# Patient Record
Sex: MALE | Race: BLACK OR AFRICAN AMERICAN
[De-identification: names, ages, dates, MRNs, and addresses within clinical notes are randomized per-mention and may not be internally consistent; named-entity substitution may affect disease eponyms.]

---

## 2018-01-30 ENCOUNTER — HOSPITAL ENCOUNTER (INPATIENT)
Dept: HOSPITAL 92 - ERS | Age: 23
LOS: 1 days | Discharge: HOME | DRG: 133 | End: 2018-01-31
Attending: HOSPITALIST | Admitting: HOSPITALIST
Payer: COMMERCIAL

## 2018-01-30 VITALS — BODY MASS INDEX: 19.5 KG/M2

## 2018-01-30 DIAGNOSIS — D75.89: ICD-10-CM

## 2018-01-30 DIAGNOSIS — J45.909: ICD-10-CM

## 2018-01-30 DIAGNOSIS — D14.1: Primary | ICD-10-CM

## 2018-01-30 DIAGNOSIS — J98.4: ICD-10-CM

## 2018-01-30 DIAGNOSIS — J18.1: ICD-10-CM

## 2018-01-30 DIAGNOSIS — J47.0: ICD-10-CM

## 2018-01-30 LAB
ALBUMIN SERPL BCG-MCNC: 4.4 G/DL (ref 3.5–5)
ALP SERPL-CCNC: 81 U/L (ref 40–150)
ALT SERPL W P-5'-P-CCNC: 21 U/L (ref 8–55)
ANION GAP SERPL CALC-SCNC: 12 MMOL/L (ref 10–20)
AST SERPL-CCNC: 27 U/L (ref 5–34)
BASOPHILS # BLD AUTO: 0 THOU/UL (ref 0–0.2)
BASOPHILS NFR BLD AUTO: 0.2 % (ref 0–1)
BILIRUB SERPL-MCNC: 0.5 MG/DL (ref 0.2–1.2)
BUN SERPL-MCNC: 12 MG/DL (ref 8.9–20.6)
CALCIUM SERPL-MCNC: 10.1 MG/DL (ref 7.8–10.44)
CHLORIDE SERPL-SCNC: 97 MMOL/L (ref 98–107)
CO2 SERPL-SCNC: 36 MMOL/L (ref 22–29)
CREAT CL PREDICTED SERPL C-G-VRATE: 111 ML/MIN (ref 70–130)
EOSINOPHIL # BLD AUTO: 0 THOU/UL (ref 0–0.7)
EOSINOPHIL NFR BLD AUTO: 0.2 % (ref 0–10)
GLOBULIN SER CALC-MCNC: 3.9 G/DL (ref 2.4–3.5)
GLUCOSE SERPL-MCNC: 113 MG/DL (ref 70–105)
HGB BLD-MCNC: 15.8 G/DL (ref 14–18)
LYMPHOCYTES # BLD: 1.5 THOU/UL (ref 1.2–3.4)
LYMPHOCYTES NFR BLD AUTO: 20.5 % (ref 21–51)
MACROCYTES BLD QL SMEAR: (no result) (100X)
MCH RBC QN AUTO: 35.7 PG (ref 27–31)
MCV RBC AUTO: 108 FL (ref 80–94)
MDIFF COMPLETE?: YES
MONOCYTES # BLD AUTO: 0.1 THOU/UL (ref 0.11–0.59)
MONOCYTES NFR BLD AUTO: 1.4 % (ref 0–10)
NEUTROPHILS # BLD AUTO: 5.8 THOU/UL (ref 1.4–6.5)
NEUTROPHILS NFR BLD AUTO: 77.7 % (ref 42–75)
PLATELET # BLD AUTO: 214 THOU/UL (ref 130–400)
PLATELET BLD QL SMEAR: (no result)
POTASSIUM SERPL-SCNC: 5.3 MMOL/L (ref 3.5–5.1)
RBC # BLD AUTO: 4.43 MILL/UL (ref 4.7–6.1)
SODIUM SERPL-SCNC: 140 MMOL/L (ref 136–145)
WBC # BLD AUTO: 7.5 THOU/UL (ref 4.8–10.8)

## 2018-01-30 PROCEDURE — 81001 URINALYSIS AUTO W/SCOPE: CPT

## 2018-01-30 PROCEDURE — 80053 COMPREHEN METABOLIC PANEL: CPT

## 2018-01-30 PROCEDURE — 80048 BASIC METABOLIC PNL TOTAL CA: CPT

## 2018-01-30 PROCEDURE — S0028 INJECTION, FAMOTIDINE, 20 MG: HCPCS

## 2018-01-30 PROCEDURE — 85025 COMPLETE CBC W/AUTO DIFF WBC: CPT

## 2018-01-30 PROCEDURE — 0CBS8ZX EXCISION OF LARYNX, VIA NATURAL OR ARTIFICIAL OPENING ENDOSCOPIC, DIAGNOSTIC: ICD-10-PCS | Performed by: OTOLARYNGOLOGY

## 2018-01-30 PROCEDURE — 71260 CT THORAX DX C+: CPT

## 2018-01-30 PROCEDURE — 36415 COLL VENOUS BLD VENIPUNCTURE: CPT

## 2018-01-30 PROCEDURE — 0BJ08ZZ INSPECTION OF TRACHEOBRONCHIAL TREE, VIA NATURAL OR ARTIFICIAL OPENING ENDOSCOPIC: ICD-10-PCS | Performed by: OTOLARYNGOLOGY

## 2018-01-30 PROCEDURE — 0C5S8ZZ DESTRUCTION OF LARYNX, VIA NATURAL OR ARTIFICIAL OPENING ENDOSCOPIC: ICD-10-PCS | Performed by: OTOLARYNGOLOGY

## 2018-01-30 PROCEDURE — 88305 TISSUE EXAM BY PATHOLOGIST: CPT

## 2018-01-30 PROCEDURE — A4216 STERILE WATER/SALINE, 10 ML: HCPCS

## 2018-01-30 RX ADMIN — FAMOTIDINE SCH MG: 10 INJECTION, SOLUTION INTRAVENOUS at 20:52

## 2018-01-30 NOTE — OP
PREOPERATIVE DIAGNOSES:

1.  Obstructive laryngeal lesion.

2.  Personal history of recurrent respiratory papillomatosis.

 

POSTOPERATIVE DIAGNOSIS:  Recurrent respiratory papillomas.

 

PROCEDURES PERFORMED:

1.  Microsuspension laryngoscopy with biopsy and microdebridor destruction of removal of obstructing 
laryngeal lesion.

2.  Rigid bronchoscopy.

 

PROCEDURE IN DETAIL:  After patient was identified, brought to the operating room and placed in field
 in supine position.  General endotracheal anesthesia was obtained using a small endotracheal tube an
d the patient was positioned for surgery.  The laryngeal larynx was examined and found the entire lar
yngeal inlet was filled with respiratory papillomas causing near complete obstruction.  The patient w
as in significant airway compromise and the endotracheal tube was passed and the patient was _____ . 
 We then under microscopic visualization, was able to debride and remove the obstructive laryngeal le
sions using the [employer] microdebrider.  Caution was made not to injure the underlying laryngeal str
uctures, the subglottic space, trachea and down to the jose was examined and there were soft tissue
 involvement apparently at mid tracheal region.  This was done with rigid bronchoscopy.  Because the 
patient had been on some blood thinners, we sprayed some thrombin on the vocal cords and subsequently
 suctioned that to facilitate hemostasis and we also treated with topical lidocaine.  The patient was
 ultimately awakened, extubated, and taken to recovery where she remained in stable condition.  Prior
 to discharge home, the patient will need to continually worked up because there was a finding concur
rently of bilateral pulmonary masses suspicious for metastatic disease.  Biopsy was obtained of obstr
uctive laryngeal lesion to rule out malignant transformation.

## 2018-01-30 NOTE — HP
PRIMARY CARE PHYSICIAN:  Patient is a city call admission.

 

The patient is transferred from [facility] for further evaluation.

 

REASON FOR ADMISSION:  Transfer from [facility] for pneumonia, papillary tumor of v
ocal cord.

 

HISTORY OF PRESENT ILLNESS:  A 22-year-old -American male who has history of vocal cord papill
moses tumor as well as asthma.  He went to emergency room there for generalized fatigue, wheezing, weak
ness, shortness of breath, cough with productive yellowish-white sputum.  These symptoms are ongoing 
slowly for about a month, but lately for the last one week, the patient's symptomatology has gotten w
orse.  He feels chest tightness.  He feels shortness of breath.  He feels wheezing.  He feels subject
wilian fever, but no chills.  Denies any body ache.  He does have nasal congestion, but no sore throat. 
 When he went to [facility] Emergency Room, he was slightly hypoxic.  The patient was otherw
ise hemodynamically stable.  His saturation was 91%.  He had routine blood tests there which were com
pletely unremarkable including CBC, BMP, and LFT.  His chest x-ray showed rounded opacity of left pos
terior mid lung zone and there was concern of underlying pneumonia.  Subsequently, the patient had CT
 neck, soft tissue, and CT chest which showed soft tissue mass on both vocal cords causing narrowing 
of laryngeal airway and CT chest showed multiple cavitary nodule on both lungs with left lower lobe c
onsolidation.  The patient denies any recent travel.  He denies any sick exposure.

 

The patient was transferred to our hospital.  At that time, the patient was tachycardic.  He was satu
rating normal on room air.  Pulmonologist and ENT were consulted.  The patient was subsequently admit
kermit in IICU.

 

When I saw this patient at that time, patient was comfortable, but he was complaining that he has dif
ficulty talking sometimes and he has difficulty phonating as well and that for the last month, the pa
tient is gradually getting worse.  The patient has a history of papilloma and he requires several case
es papilloma removed since age of 4 years.

 

REVIEW OF SYSTEMS:  The following complete review of systems was negative, unless otherwise mentioned
 in the HPI or below:

Constitutional:  Weight loss or gain, ability to conduct usual activities.

Skin:  Rash, itching.

Eyes:  Double vision, pain.

ENT/Mouth:  Nose bleeding, neck stiffness, pain, tenderness.

Cardiovascular:  Palpitations, dyspnea on exertion, orthopnea.

Respiratory:  Shortness of breath, wheezing, cough, hemoptysis, fever or night sweats.

Gastrointestinal:  Poor appetite, abdominal pain, heartburn, nausea, vomiting, constipation, or diarr
hea.

Genitourinary:  Urgency, frequency, dysuria, nocturia.

Musculoskeletal:  Pain, swelling.

Neurologic/Psychiatric:  Anxiety, depression.

Allergy/Immunologic:  Skin rash, bleeding tendency.

Please see my HPI for pertinent positives and negatives.  All other review of systems reviewed and ne
gative except as mentioned in the HPI.

 

ALLERGIES:  No known drug allergies.

 

CURRENT HOME MEDICATIONS:  The patient is not taking prescribed or non-prescribed medication.

 

PAST MEDICAL HISTORY:  Papilloma of vocal cord since age of 4, asthma.

 

PAST SURGICAL HISTORY:  Several times papilloma tumor removed from vocal cord.

 

PAST PSYCHIATRIC HISTORY:  Reviewed and negative.

 

SOCIAL HISTORY:  Patient drinks alcohol almost every week, but he denies any smoking.  He denies any 
other illicit drug abuse.  He lives by himself.

 

FAMILY HISTORY:  No strong family history of premature coronary artery disease, stroke, or cancer.

 

EMERGENCY ROOM COURSE:  Reviewed.

 

PHYSICAL EXAMINATION:

VITAL SIGNS:  On arrival, blood pressure 137/85, pulse 110, respiratory rate 20, temperature 97.8, sa
turation 94% on room air, weight 65.7 kg.

GENERAL:  Patient is currently alert, awake on room air.  No obvious acute distress, able to talk in 
full sentences.

HEENT:  Head is normocephalic and atraumatic.  Eyes:  Pupils are round and reactive to light.  Extrao
cular muscles are intact.  ENT:  Oropharynx within normal limits.  Moist mucous membranes.  No oral l
esions.  No pharyngeal erythema, no exudate.

NECK:  Supple.  No JVD, no thyromegaly, no carotid bruit, no meningeal signs of irritation.

LUNGS:  Coarse breath sounds, but no rhonchi, no wheeze.  No accessory muscles of respiration.  No in
tercostal retractions.

CARDIAC:  S1 and S2 regular, tachycardia.  No murmur, no gallop, no rub.

ABDOMEN:  Soft, bowel sounds present.  Nontender, nondistended.  No organomegaly.  No mass.  No supra
pubic tenderness.

BACK:  Unremarkable, no CVA tenderness.

EXTREMITIES:  Upper extremities:  Passive movement of all joints are normal.  Lower extremities:  No 
edema.  Good peripheral pulsation.

SKIN:  No skin rash.

HEMATOLOGICAL:  No lymphadenopathy.

PSYCHIATRIC:  Normal affect.

 

SIGNIFICANT LABS:  Blood tests done at [facility] completely reviewed including CT 
scan.  The patient has a 2.4 cm thin walled cavitary focus in the right upper lobe variable size thin
-walled cavitary foci within right upper lobe and left lower lobe as well as the right middle lobe an
d right lower lobe, bronchiectasis and bronchial wall thickening in left lower lobe, left lower lobe 
consolidation.  The patient also has irregular soft tissue mass in both vocal cords.

 

CBC:  WBC 7.5, hemoglobin 15.8, , platelets 214 with bandemia.  BMP:  Sodium 140, potassium 5.
3, chloride 97, carbon dioxide 36, anion gap 12, BUN 12, creatinine 0.93, glucose 113, calcium 10.1. 
 LFT:  AST 27, ALT 21, alkaline phosphatase 81, albumin 4.4.

 

ASSESSMENT AND PLAN:

1.  Bilateral vocal cord papillary tumor.  At this point, the patient is having dyspnea, most likely 
related with that.  ENT doctor is consulted and further decisions will defer to them.

2.  Bilateral cavitary pulmonary nodule, multiple.  Differential diagnoses is infectious etiology, We
gener's granulomatosis, fungal infection.  Pulmonary will be consulted and they will decide whether t
his patient need any bronchoscopy or any further evaluation or not.  Considering infection, we will s
tart broad spectrum antibiotic therapy.

3.  Left lower lobe pneumonia/bronchiectasis.  The patient will be started on broad spectrum antibiot
ic therapy with cefepime, Levaquin, or vancomycin if pulmonary okay.  Doubt this patient has any infe
ction, most likely related with noninfectious process, but we will closely monitor.

4.  Asthma.  The patient will be given albuterol nebulization therapy.

5.  Macrocytosis.  The patient will be given multivitamin therapy IV along with IV fluid.

6.  Deep venous thrombosis prophylaxis.  SCD boots.  Lovenox 40 mg subcutaneously daily.

7.  Gastrointestinal prophylaxis.  Pepcid 20 mg IV b.i.d.

8.  Code status:  The patient is FULL CODE.  The patient does not have a surrogate decision maker.

 

Disposition plan based on clinical course.  We are expecting patient's stay in hospital more than 2 m
idnights.  Plan of care discussed with the patient in detail.

## 2018-01-30 NOTE — CON
DATE OF CONSULTATION:  01/30/2018

 

SERVICE:  Pulmonary Medicine.

 

REASON FOR CONSULTATION:  ICU patient.

 

HISTORY OF PRESENT ILLNESS:  The patient is a 22-year-old -American male with past medical his
tory significant for recurrent papillomas of the laryngeal region.  He has had a couple of operations
 in the past, because of upper airway issues.  His last surgery was multiple years ago.  He had progr
essive increasing difficulty with breathing.  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  All o
f his other physicians are in the [place] area that previously took care of him.  Because he was in [facility] and is having difficulty and there was no ability to transfer him to [place], he was subseq
uently sent here.  He was tucked into the ICU overnight.  There were some films that were done in the
 outside facility and we have the reports for review, but none of the images were sent because the 
rner was down.  The patient other than his stridor, and difficulty with moving air, is otherwise with
out any symptoms of fevers, chills, nausea, vomiting, cough or sputum production.  He is otherwise in
 his usual state of health and has no specific complaints.  He does have retractions with inspiration
 and has prolonged expiratory phase.  I had him sniff aggressively and there was no significant impro
vement in his inspiratory phase.

 

PAST MEDICAL HISTORY:

1.  Recurrent papillomas of the larynx.

2.  Asthma.

 

PAST SURGICAL HISTORY:  Laryngeal procedures for excision of papillomas, recurrent.

 

SOCIAL HISTORY:  Negative for significant alcohol, tobacco or illicit drug use.  He has no exposure t
o chemicals, doses, asbestos or tuberculosis.

 

FAMILY HISTORY:  Noncontributory.

 

ALLERGIES:  No known drug allergies.

 

MEDICATIONS:  List of his inpatient medications were reviewed.  Couple of small updates were made.

 

REVIEW OF SYSTEMS:  General, head, ears, eyes, nose, throat, cardiovascular, respiratory, GI, , mus
culoskeletal, neurologic and skin is negative except as mentioned in the HPI.

 

PHYSICAL EXAMINATION:

VITAL SIGNS:  Afebrile, pulse 106, blood pressure 112/80, respirations 23, saturation 100% on 2 liter
s nasal cannula.

GENERAL:  Patient is awake, alert, in no apparent distress.

LUNGS:  He has a prolonged expiratory and inspiratory phase.  Aggressive sniffing does not improve hi
s inspiratory phase significantly.  He is not moving enough air to appreciate adventitious sounds, th
ough I get the feeling that not present.

HEART:  Normal rate, regular.

ABDOMEN:  Soft, nontender, nondistended.  Bowel sounds are positive.

MUSCULOSKELETAL:  No cyanosis or clubbing.  There is no pitting in the bilateral lower extremities.

 

IMAGING:  CT reports were reviewed of the neck, and of the chest.  There are multiple cavitary lesion
s, mass-like lesions, and pulmonary nodules present throughout the bilateral lung fields.  The CT of 
the neck was reviewed and they did not specifically note any significant growths in the laryngeal reg
ion.

 

ASSESSMENT:

1.  Stridor.

2.  History of papilloma tumors that required previous surgical interventions.

3.  Abnormal CT of the chest including pulmonary cavities, nodules, masses, and cystic lesions.

4.  History of asthma without current exacerbation.

 

PLAN:  ENT consultation has been placed.  The patient will likely need to undergo an evaluation of th
e larynx in the operating room.  The CT of the neck did not specifically demonstrate any soft tissue 
growths in this region, so vocal cord dysfunction is a distinct possibility, but I feel a cautious ap
proach is still indicated.  CPAP will be initiated to see if this improves the air movement.  Once hi
s upper airway issue gets evaluated and stabilized, the patient can be considered for workup of the m
ultiple pulmonary findings.  I would likely defer this to the outpatient setting.  He needs to follow
 up with the pulmonologist that he previously worked with.  Pulmonary and Critical Care will continue
 to follow.

## 2018-01-31 VITALS — TEMPERATURE: 99 F | DIASTOLIC BLOOD PRESSURE: 59 MMHG | SYSTOLIC BLOOD PRESSURE: 111 MMHG

## 2018-01-31 LAB
ANION GAP SERPL CALC-SCNC: 8 MMOL/L (ref 10–20)
BASOPHILS # BLD AUTO: 0 THOU/UL (ref 0–0.2)
BASOPHILS NFR BLD AUTO: 0.4 % (ref 0–1)
BUN SERPL-MCNC: 14 MG/DL (ref 8.9–20.6)
CALCIUM SERPL-MCNC: 9 MG/DL (ref 7.8–10.44)
CHLORIDE SERPL-SCNC: 99 MMOL/L (ref 98–107)
CO2 SERPL-SCNC: 36 MMOL/L (ref 22–29)
CREAT CL PREDICTED SERPL C-G-VRATE: 122 ML/MIN (ref 70–130)
CRYSTAL-AUWI FLAG: 0 (ref 0–15)
EOSINOPHIL # BLD AUTO: 0 THOU/UL (ref 0–0.7)
EOSINOPHIL NFR BLD AUTO: 0.2 % (ref 0–10)
GLUCOSE SERPL-MCNC: 163 MG/DL (ref 70–105)
HEV IGM SER QL: 1.4 (ref 0–7.99)
HGB BLD-MCNC: 13.8 G/DL (ref 14–18)
HYALINE CASTS #/AREA URNS LPF: (no result) LPF
LYMPHOCYTES # BLD: 2.9 THOU/UL (ref 1.2–3.4)
LYMPHOCYTES NFR BLD AUTO: 27.6 % (ref 21–51)
MCH RBC QN AUTO: 34.9 PG (ref 27–31)
MCV RBC AUTO: 107 FL (ref 80–94)
MONOCYTES # BLD AUTO: 0.6 THOU/UL (ref 0.11–0.59)
MONOCYTES NFR BLD AUTO: 5.5 % (ref 0–10)
NEUTROPHILS # BLD AUTO: 7 THOU/UL (ref 1.4–6.5)
NEUTROPHILS NFR BLD AUTO: 66.3 % (ref 42–75)
PATHC CAST-AUWI FLAG: 0 (ref 0–2.49)
PLATELET # BLD AUTO: 199 THOU/UL (ref 130–400)
POTASSIUM SERPL-SCNC: 3.6 MMOL/L (ref 3.5–5.1)
RBC # BLD AUTO: 3.95 MILL/UL (ref 4.7–6.1)
RBC UR QL AUTO: (no result) HPF (ref 0–3)
SODIUM SERPL-SCNC: 139 MMOL/L (ref 136–145)
SP GR UR STRIP: 1.02 (ref 1–1.04)
SPERM-AUWI FLAG: 0 (ref 0–9.9)
WBC # BLD AUTO: 10.5 THOU/UL (ref 4.8–10.8)
WBC UR QL AUTO: (no result) HPF (ref 0–3)
YEAST-AUWI FLAG: 0 (ref 0–25)

## 2018-01-31 RX ADMIN — FAMOTIDINE SCH MG: 10 INJECTION, SOLUTION INTRAVENOUS at 21:28

## 2018-01-31 RX ADMIN — FAMOTIDINE SCH MG: 10 INJECTION, SOLUTION INTRAVENOUS at 09:01

## 2018-01-31 NOTE — PDOC.PN
- Subjective


Encounter Start Date: 01/31/18


Encounter Start Time: 09:10


-: old records requested/rev





Patient seen and examined. No new complaints. No overnight events


no respiratory distress, has soft voice, no fever





- Objective


Resuscitation Status: 


 











Resuscitation Status           FULL:Full Resuscitation














MAR Reviewed: Yes


Vital Signs & Weight: 


 Vital Signs (12 hours)











  Temp Pulse Resp BP Pulse Ox


 


 01/31/18 10:21  98 F  86  18  119/67  94 L


 


 01/31/18 10:11  99.4 F  86  18   94 L


 


 01/31/18 08:00  99.4 F  86  18   94 L


 


 01/31/18 07:46  99.4 F  86  18  116/66  94 L


 


 01/31/18 05:24  98.3 F  90  18  106/56 L  95


 


 01/31/18 00:18   108 H  20  112/58 L  95








 Weight











Weight                         137 lb 9.6 oz











 Most Recent Monitor Data











Heart Rate from ECG            102


 


NIBP                           116/76


 


NIBP BP-Mean                   90


 


Respiration from ECG           20


 


SpO2                           93














I&O: 


 











 01/30/18 01/31/18 02/01/18





 06:59 06:59 06:59


 


Intake Total  1618 240


 


Output Total  1375 


 


Balance  243 240











Result Diagrams: 


 01/31/18 08:53





 01/31/18 08:53


Radiology Reviewed by me: Yes (CT chest)


EKG Reviewed by me: Yes (NSR)





Phys Exam





- Physical Examination


Constitutional: NAD


HEENT: PERRLA, moist MMs, sclera anicteric


Neck: no JVD, supple


Respiratory: no wheezing, no rales, no rhonchi


Cardiovascular: RRR, no significant murmur, no rub


Gastrointestinal: soft, non-tender, no distention, positive bowel sounds


Musculoskeletal: no edema, pulses present


Neurological: non-focal, normal sensation


Lymphatic: no nodes


Psychiatric: normal affect, A&O x 3


Skin: no rash, normal turgor





Dx/Plan


(1) Cavitary lesion of lung


Code(s): J98.4 - OTHER DISORDERS OF LUNG   Status: Acute   





(2) Consolidation lung


Code(s): J18.1 - LOBAR PNEUMONIA, UNSPECIFIED ORGANISM   Status: Acute   





(3) Asthma


Code(s): J45.909 - UNSPECIFIED ASTHMA, UNCOMPLICATED   Status: Chronic   





(4) Benign neoplasm of vocal cord


Code(s): D14.1 - BENIGN NEOPLASM OF LARYNX   Status: Chronic   





(5) Macrocytosis


Code(s): D75.89 - OTHER SPECIFIED DISEASES OF BLOOD AND BLOOD-FORMING ORGANS   

Status: Chronic   





- Plan


cont current plan of care





* s/p laryngoscopy


* he will need bronchoscopy


* today CT chest


* Transfer to medical floor


* discussed with pulmonary


* overall stable


* medication reviewed as below


* symptomatic treatment.








Review of Systems





- Review of Systems


Constitutional: negative: fever, chills, sweats, weakness, malaise, other


Eyes: negative: Pain, Vision Change, Conjunctivae Inflammation, Eyelid 

Inflammation, Redness, Other


ENT: negative: Ear Pain, Ear Discharge, Nose Pain, Nose Discharge, Nose 

Congestion, Mouth Pain, Mouth Swelling, Throat Pain, Throat Swelling, Other


Respiratory: Cough.  negative: Dry, Shortness of Breath, Hemoptysis, SOB with 

Excertion, Pleuritic Pain, Sputum, Wheezing


Cardiovascular: negative: chest pain, palpitations, orthopnea, paroxysmal 

nocturnal dyspnea, edema, light headedness, other


Gastrointestinal: negative: Nausea, Vomiting, Abdominal Pain, Diarrhea, 

Constipation, Melena, Hematochezia, Other


Genitourinary: negative: Dysuria, Frequency, Incontinence, Hematuria, Retention

, Other


Musculoskeletal: negative: Neck Pain, Shoulder Pain, Arm Pain, Back Pain, Hand 

Pain, Leg Pain, Foot Pain, Other


Skin: negative: Rash, Lesions, [place], Bruising, Other





- Medications/Allergies


Allergies/Adverse Reactions: 


 Allergies











Allergy/AdvReac Type Severity Reaction Status Date / Time


 


No Known Drug Allergies Allergy   Verified 01/30/18 09:04











Medications: 


 Current Medications





Acetaminophen (Tylenol)  650 mg PO Q4H PRN


   PRN Reason: Headache/Fever or Pain


Hydrocodone Bitart/Acetaminophen (Norco 5/325)  1 tab PO Q4H PRN


   PRN Reason: Moderate Pain (4-6)


Al Hydroxide/Mg Hydroxide (Maalox)  30 ml PO Q6H PRN


   PRN Reason: Heartburn  or Indigestion


Albuterol Sulfate (Ventolin)  2.5 mg NEB [id number] PRN


   PRN Reason: Wheezing


Artificial Tears (Tears Naturale)  0 drop EA EYE PRN PRN


   PRN Reason: Dry Eyes


Enoxaparin Sodium (Lovenox)  40 mg SC 0900 OZ


   Last Admin: 01/31/18 09:01 Dose:  40 mg


Famotidine (Pepcid)  20 mg SLOW IVP BID OZ


   Last Admin: 01/31/18 09:01 Dose:  20 mg


Guaifenesin (Robitussin Sf)  200 mg PO Q4H PRN


   PRN Reason: Cough


Hydralazine HCl (Apresoline)  10 mg SLOW IVP Q4H PRN


   PRN Reason: Systolic BP > 180


Loperamide HCl (Imodium)  2 mg PO PRN PRN


   PRN Reason: Diarrhea/Loose Stools


Loratadine (Claritin)  10 mg PO DAILYPRN PRN


   PRN Reason: Sinus Symptoms


Magnesium Hydroxide (Milk Of Magnesium)  30 ml PO DAILYPRN PRN


   PRN Reason: Constipation


Mineral Oil/White Petrolatum (Eucerin Cream)  0 gm TOP BIDPRN PRN


   PRN Reason: Dry Skin


Ondansetron HCl (Zofran Odt)  4 mg PO Q6H PRN


   PRN Reason: Nausea/Vomiting


Ondansetron HCl (Zofran)  4 mg IVP Q6H PRN


   PRN Reason: Nausea/Vomiting


Phenol (Chloraseptic Spray 180 Ml Bot)  0 ml PO PRN PRN


   PRN Reason: Sore Throat


Senna (Senokot)  2 tab PO HSPRN PRN


   PRN Reason: Constipation


Sodium Chloride (Ocean Nasal Spray 0.65%)  0 ml EA NARE QIDPRN PRN


   PRN Reason: Nasal Congestion


Sodium Chloride (Flush - Normal Saline)  10 ml IVF Q12HR OZ


Sodium Chloride (Flush - Normal Saline)  10 ml IVF PRN PRN


   PRN Reason: Saline Flush


Zolpidem Tartrate (Ambien)  5 mg PO HSPRN PRN


   PRN Reason: Insomnia

## 2018-01-31 NOTE — DIS
DATE OF ADMISSION:  01/30/2018

 

DATE OF DISCHARGE:  01/31/2018

 

PRIMARY CARE PHYSICIAN:  [facility] call admission.

 

DISCHARGE DISPOSITION:  Home.

 

PRIMARY DISCHARGE DIAGNOSES:

1.  Cavitary lesion of lung.

2.  Consolidation of lung.

3.  Benign neoplasm of vocal cord.

 

SECONDARY DISCHARGE DIAGNOSES:  Macrocytosis due to alcohol use, asthma, papilloma of vocal cord.

 

PRIMARY PROCEDURE/OPERATION:  Laryngoscopy and biopsy of papilloma of vocal cord.

 

RADIOLOGICAL INVESTIGATION:  CT chest was done while in hospital, which showed mass-like consolidativ
e changes in left lower lobe.  Cystic changes in the lung.

 

SIGNIFICANT LABORATORIES:  WBC 10.5, hemoglobin 13.8, , platelets 199.  Sodium 139, potassium 
3.6, BUN 14, creatinine 0.83, calcium 9.0.  LFTs normal.  Urinalysis normal.

 

DISCHARGE MEDICATIONS:  Augmentin 875 mg twice daily for 2 weeks, Proventil HFA 2 puffs q.6 hourly p.
r.n., Florastor 250 mg p.o. daily for 2 weeks.

 

CONTRAINDICATIONS:  None.

 

CODE STATUS:  FULL CODE.

 

INPATIENT CONSULTANTS:  Dr. Jamil Moran was consulted, who did laryngoscopy and biopsy.  Biopsy rep
ort subsequently came back as squamous papilloma with minimal HPV changes.  Dr. Manley consulted i
rosario in hospital, and he recommended to change to Augmentin and follow up with him as an outpatient bas
is and repeat CT scan in 1 month.

 

TEST RESULTS PENDING ON DISCHARGE:  None.

 

ALLERGIES:  No known drug allergy.

 

DISCHARGE PLAN:  Post hospital, the patient will follow up with primary care physician, Dr. Jamil bowers, and Dr. Manley as instructed.

 

HOSPITAL COURSE:  This is a 22-year-old male, who has papilloma of vocal cord for the last several ye
ars, required intermittent resection.  This time he was having difficulty talking and difficulty jena
thing with stridor.  He went to [facility] where they did CT neck and CT chest.  Patie
nt had enlarging papilloma of around both vocal cord and he was also found with a cystic lesion in srikanth
th lungs and consolidative changes in the left lower lobe.  He was transferred to our hospital.  He w
as admitted overnight in ICU.  Dr. Manley and Dr. Moran were consulted.  After that, patient was tr
ansferred to [facility], and next day we transferred him to medical floor.  Dr. Moran did laryngoscopy and
 biopsy was obtained, and biopsy report was consistent with papilloma with HPV changes.  Dr. Manley 
did a repeat CT chest, which showed similar finding.  Dr. Manley recommended to change to Augmentin 
for 15 days and he will follow up with the patient as an outpatient basis.

 

The patient was keen to go home today.  I spent a total of more than 30 minutes to coordinate this di
fritz.

 

The patient is seen and examined at bedside today.  Please see my progress note from today for furthe
r details.

 

Total time spent on discharge day more than 30 minutes.

## 2018-01-31 NOTE — PRG
DATE OF SERVICE:  01/31/2018

 

SERVICE:  Pulmonary Medicine.

 

INTERVAL HISTORY:  The patient is doing fine from a respiratory standpoint.  He is breathing much bet
ter today.  He denies any current fevers, chills, shortness of breath or chest discomfort.  He has a 
little bit of hoarseness, but otherwise, he does not have any specific complaints.

 

PHYSICAL EXAMINATION:

VITAL SIGNS:  Afebrile with T-max of 99.4, pulse 86, blood pressure 119/67, respirations 18, saturati
on 94% on room air.

GENERAL:  Patient is awake, alert, no apparent distress.

LUNGS:  Decent air entry.  Rhonchi are present at the left base.  Otherwise, there is no prolonged ex
piratory phase or wheezing.

HEART:  Normal rate, regular.

ABDOMEN:  Soft, nontender, nondistended, bowel sounds positive.

MUSCULOSKELETAL:  No cyanosis or clubbing.  No pitting in the bilateral lower extremities.

NEUROLOGIC:  Grossly nonfocal.

 

LABORATORY DATA:  CBC is grossly unremarkable.  Basic metabolic profile is also unremarkable except f
or bicarbonate of 36.  Urinalysis is negative.

 

IMAGING:  CT of the chest demonstrates multiple cystic and some larger walled cysts and borderline ca
vities scattered throughout the bilateral lung fields.  There is also an infiltrative lesion in the l
eft lower lobe.  This is in the posterior segment of that location.  There is a nodular/mass like com
ponent to part of that peripheral area.

 

ASSESSMENT:

1.  Recurrent laryngeal papillomatosis.

2.  Stridor, resolved.

3.  Abnormal CT of the chest including cavities, cysts, and left lower lobe infiltrate that has a com
ponent that appears mass-like.

4.  History of asthma without current exacerbation.

 

DISCUSSION AND PLAN:  The patient is now breathing very comfortably.  The CT of the chest is probably
 consistent with an infection.  Whether or not there is an underlying mass, I really cannot comment o
n, but the patient really did not have enough cough to clear his airway secretion and stands to reaso
n that this could be an infectious process in the left lower lobe.  As such, I will put him on 2 week
s of Augmentin.  I will have him return to clinic in the outpatient setting in 4-6 weeks for the repe
at CT scan of the chest.  At that point, if the mass persists, additional diagnostic studies may be c
onsidered.  I am hopeful that we are not dealing with malignant transformation of the papillomatosis,
 but this is a distinct possibility.  Additionally, the cystic lesions will need to be worked up sepa
rately.  Pulmonary and Critical Care will continue to follow.  From my perspective, he can be transit
ioned to the medical unit today.